# Patient Record
Sex: MALE | Race: WHITE | NOT HISPANIC OR LATINO | URBAN - METROPOLITAN AREA
[De-identification: names, ages, dates, MRNs, and addresses within clinical notes are randomized per-mention and may not be internally consistent; named-entity substitution may affect disease eponyms.]

---

## 2017-05-22 ENCOUNTER — INPATIENT (INPATIENT)
Facility: HOSPITAL | Age: 57
LOS: 1 days | Discharge: HOME | DRG: 948 | End: 2017-05-24
Attending: SURGERY | Admitting: SURGERY
Payer: COMMERCIAL

## 2017-05-22 VITALS
TEMPERATURE: 98 F | OXYGEN SATURATION: 98 % | RESPIRATION RATE: 20 BRPM | SYSTOLIC BLOOD PRESSURE: 137 MMHG | HEART RATE: 67 BPM | DIASTOLIC BLOOD PRESSURE: 85 MMHG

## 2017-05-22 DIAGNOSIS — Z90.49 ACQUIRED ABSENCE OF OTHER SPECIFIED PARTS OF DIGESTIVE TRACT: Chronic | ICD-10-CM

## 2017-05-22 PROCEDURE — 99285 EMERGENCY DEPT VISIT HI MDM: CPT

## 2017-05-22 RX ORDER — SODIUM CHLORIDE 9 MG/ML
1000 INJECTION, SOLUTION INTRAVENOUS
Qty: 0 | Refills: 0 | Status: DISCONTINUED | OUTPATIENT
Start: 2017-05-22 | End: 2017-05-24

## 2017-05-22 RX ORDER — ONDANSETRON 8 MG/1
4 TABLET, FILM COATED ORAL EVERY 6 HOURS
Qty: 0 | Refills: 0 | Status: DISCONTINUED | OUTPATIENT
Start: 2017-05-22 | End: 2017-05-24

## 2017-05-22 RX ORDER — SENNA PLUS 8.6 MG/1
2 TABLET ORAL AT BEDTIME
Qty: 0 | Refills: 0 | Status: DISCONTINUED | OUTPATIENT
Start: 2017-05-22 | End: 2017-05-24

## 2017-05-22 RX ORDER — MORPHINE SULFATE 50 MG/1
4 CAPSULE, EXTENDED RELEASE ORAL ONCE
Qty: 0 | Refills: 0 | Status: DISCONTINUED | OUTPATIENT
Start: 2017-05-22 | End: 2017-05-22

## 2017-05-22 RX ORDER — HYDROMORPHONE HYDROCHLORIDE 2 MG/ML
1 INJECTION INTRAMUSCULAR; INTRAVENOUS; SUBCUTANEOUS EVERY 4 HOURS
Qty: 0 | Refills: 0 | Status: DISCONTINUED | OUTPATIENT
Start: 2017-05-22 | End: 2017-05-24

## 2017-05-22 RX ORDER — DOCUSATE SODIUM 100 MG
100 CAPSULE ORAL THREE TIMES A DAY
Qty: 0 | Refills: 0 | Status: DISCONTINUED | OUTPATIENT
Start: 2017-05-22 | End: 2017-05-24

## 2017-05-22 RX ADMIN — MORPHINE SULFATE 4 MILLIGRAM(S): 50 CAPSULE, EXTENDED RELEASE ORAL at 21:38

## 2017-05-22 RX ADMIN — MORPHINE SULFATE 4 MILLIGRAM(S): 50 CAPSULE, EXTENDED RELEASE ORAL at 20:53

## 2017-05-22 NOTE — H&P ADULT - HISTORY OF PRESENT ILLNESS
57M pt Smoker, MVR s/p MV repair, POD 0 lap michael at [facility], presents to [facility] ED w/co of persistent epigastric abdominal pain, non radiating, stabbing, constant, w/o assc nausea or vomiting. Denies f/c/s. Denies cp/sob/palp. Has been administered multiple IV push of medications for pain without resolution.

## 2017-05-22 NOTE — PROGRESS NOTE ADULT - SUBJECTIVE AND OBJECTIVE BOX
· HPI Objective Statement: [facility]  Pt had cholecysectomy done this morning, reports intermittent episodes of severe abd pain. Pt denies D/N/V, SOB, chest pain, /GI symptoms.  Pt is alert and oriented x3, reports some tenderness around surgical sites.  No redness or inflammation noted at this time. Pt is alert and oriented x3.  18G IV in left AC by surgical team.	  · Presenting Symptoms: PAIN, TENDERNESS	  · Negative Findings: no abdominal distension, no blood in stool, no burning urination, no chills, no diarrhea, no dysuria, no fever, no hematuria, no nausea, no vomiting	        	  MEDICATIONS:        oxyCODONE  5 mG/acetaminophen 325 mG 1Tablet(s) Oral every 6 hours PRN            Complaint: Abdominal pain    Otherwise 12 point review of systems is normal.    PHYSICAL EXAM:    Constitutional: NAD  Eyes: PERRL, EOMI, sclera non-icteric  Neck: supple, no masses, no JVD  Respiratory: CTA b/l, good air entry b/l, no wheezing, rhonchi, rales, with normal respiratory effort and no intercostal retractions  Cardiovascular: RRR, normal S1S2, no M/R/G  Gastrointestinal: soft, tender  Extremities:  no c/c/e  Neurological: AAOx3  ICU Vital Signs Last 24 Hrs  T(C): 36.3, Max: 36.8 (05-22 @ 17:18)  T(F): 97.3, Max: 98.3 (05-22 @ 17:18)  HR: 55 (55 - 67)  BP: 137/73 (137/73 - 137/85)  BP(mean): --  ABP: --  ABP(mean): --  RR: 20 (20 - 20)  SpO2: 99% (98% - 99%)      ECG:      CHEST X RAY    CT    MRI    MRA    CT ANGIO    CAROTID DUPLEX    DUPLEX     Echocardiogram    Catheterization:    Stress Test:     LABS:	 	  CARDIAC MARKERS:                              11.0   11.3  )-----------( 167      ( 22 May 2017 18:08 )             33.0     05-22    138  |  102  |  17  ----------------------------<  115<H>  3.8   |  24  |  1.30    Ca    8.3<L>      22 May 2017 18:08    TPro  6.4  /  Alb  4.0  /  TBili  0.3  /  DBili  x   /  AST  23  /  ALT  15  /  AlkPhos  23<L>  05-22    proBNP:   Lipid Profile:   HgA1c:   TSH:           ASSESSMENT/PLAN: 	  As per Dr Umanzor

## 2017-05-22 NOTE — ED PROVIDER NOTE - NS ED ROS FT
Constitutional: no fever    All other systems negative except as per HPI Constitutional: no fever  +abdominalpain  no nausea   no vomitng    All other systems negative except as per HPI

## 2017-05-22 NOTE — ED CLERICAL - NS ED CLERK NOTE PRE-ARRIVAL INFORMATION; ADDITIONAL PRE-ARRIVAL INFORMATION
Antonio/GRAY/ TRACIE CAPUTO SENT IN BY DR. TRAYLOR FOR ABDOMINAL PAIN CALL DR. WILY MORELOS @ [phone]

## 2017-05-22 NOTE — H&P ADULT - NSHPLABSRESULTS_GEN_ALL_CORE
LABS:  CBC Full  -  ( 22 May 2017 18:08 )  WBC Count : 11.3 K/uL  Hemoglobin : 11.0 g/dL  Hematocrit : 33.0 %  Platelet Count - Automated : 167 K/uL  Mean Cell Volume : 90.4 fL  Mean Cell Hemoglobin : 30.1 pg  Mean Cell Hemoglobin Concentration : 33.3 g/dL  Auto Neutrophil # : x  Auto Lymphocyte # : x  Auto Monocyte # : x  Auto Eosinophil # : x  Auto Basophil # : x  Auto Neutrophil % : 89.0 %  Auto Lymphocyte % : 6.4 %  Auto Monocyte % : 4.4 %  Auto Eosinophil % : 0.1 %  Auto Basophil % : 0.1 %    05-22    138  |  102  |  17  ----------------------------<  115<H>  3.8   |  24  |  1.30    Ca    8.3<L>      22 May 2017 18:08    TPro  6.4  /  Alb  4.0  /  TBili  0.3  /  DBili  x   /  AST  23  /  ALT  15  /  AlkPhos  23<L>  05-22  Lipase, Serum (05.22.17 @ 18:08)    Lipase, Serum: 437 U/L  Amylase, Serum Total (05.22.17 @ 18:08)    Amylase, Serum Total: 231 U/L    PT/INR - ( 22 May 2017 18:08 )   PT: 11.3 sec;   INR: 1.02        PTT - ( 22 May 2017 18:08 )  PTT:33.3 sec

## 2017-05-22 NOTE — H&P ADULT - NSHPPHYSICALEXAM_GEN_ALL_CORE
Vital Signs Last 24 Hrs  T(C): 36.3, Max: 36.8 (05-22 @ 17:18)  T(F): 97.3, Max: 98.3 (05-22 @ 17:18)  HR: 55 (55 - 67)  BP: 137/73 (137/73 - 137/85)  BP(mean): --  RR: 20 (20 - 20)  SpO2: 99% (98% - 99%)    Physical Exam:   Gen: AOx3, pleasant in NAD  Cor: RRR, +S1S2, no MRG  Pulm: CTA b/l No W/R/S  Abd: +BS, Soft, non distended, TTP epigastrium, subcutaneous emphysema, incisions c/d/i  Ext: WWP, non edematous, DP +2 b/l

## 2017-05-22 NOTE — ED ADULT NURSE NOTE - CHPI ED SYMPTOMS NEG
no hematuria/no diarrhea/no chills/no fever/no vomiting/no blood in stool/no burning urination/no nausea/no abdominal distension/no dysuria

## 2017-05-22 NOTE — ED PROVIDER NOTE - OBJECTIVE STATEMENT
patient presents to the ed following a lap michael done earlier today .   he states he had the sx done by dr pimentel, and had severe postoperative pain for which he was sent to the ed to obtain labs patient presents to the ed following a lap michael done earlier today .   he states he had the sx done by dr pimentel, and had severe postoperative pain for which he was sent to the ed to obtain labs.  still complains of pain in the ed.   no fever no chills

## 2017-05-22 NOTE — H&P ADULT - ASSESSMENT
57M pt POD 0 lap michael with post op pain    -Admit for 23hr observation, Dr. Umanzor  -Radha, ADAT  -PO pain meds, IV pain meds only for breakthrough  -Repeat AM labs  -OOB ambulate  -VS/IO per routine

## 2017-05-22 NOTE — ED ADULT NURSE NOTE - OBJECTIVE STATEMENT
[facility]  Pt had cholecysectomy done this morning, reports intermittent episodes of severe abd pain. Pt denies D/N/V, SOB, chest pain, /GI symptoms.  Pt is alert and oriented x3, reports some tenderness around surgical sites.  No redness or inflammation noted at this time. Pt is alert and oriented x3.  18G IV in left AC by surgical team.

## 2017-05-22 NOTE — ED PROVIDER NOTE - PHYSICAL EXAMINATION
CONSTITUTIONAL: Well-appearing; well-nourished; in no apparent distress.   HEAD: Normocephalic; atraumatic.   EYES: PERRL; EOM intact; conjunctiva and sclera clear  ENT: normal nose; no rhinorrhea; normal pharynx with no erythema or lesions.   NECK: Supple; non-tender;   CARDIOVASCULAR: Normal S1, S2; no murmurs, rubs, or gallops. Regular rate and rhythm.   RESPIRATORY: Breathing easily; breath sounds clear and equal bilaterally; no wheezes, rhonchi, or rales.  GI: Soft; dressings clean dry and intact, no rigidity normal bowel sounds, +periincisional tenderness  EXT: No cyanosis or edema; N/V intact  SKIN: Normal for age and race; warm; dry; good turgor; no apparent lesions or rash.   NEURO: A & O x 3; face symmetric; grossly unremarkable.   PSYCHOLOGICAL: The patient’s mood and manner are appropriate.

## 2017-05-23 LAB
ALBUMIN SERPL ELPH-MCNC: 3.8 G/DL — SIGNIFICANT CHANGE UP (ref 3.3–5)
ALP SERPL-CCNC: 23 U/L — LOW (ref 40–120)
ALT FLD-CCNC: 13 U/L — SIGNIFICANT CHANGE UP (ref 10–45)
AMYLASE P1 CFR SERPL: 160 U/L — HIGH (ref 25–125)
ANION GAP SERPL CALC-SCNC: 9 MMOL/L — SIGNIFICANT CHANGE UP (ref 5–17)
AST SERPL-CCNC: 23 U/L — SIGNIFICANT CHANGE UP (ref 10–40)
BILIRUB SERPL-MCNC: 0.4 MG/DL — SIGNIFICANT CHANGE UP (ref 0.2–1.2)
BUN SERPL-MCNC: 17 MG/DL — SIGNIFICANT CHANGE UP (ref 7–23)
CALCIUM SERPL-MCNC: 8.2 MG/DL — LOW (ref 8.4–10.5)
CHLORIDE SERPL-SCNC: 105 MMOL/L — SIGNIFICANT CHANGE UP (ref 96–108)
CO2 SERPL-SCNC: 25 MMOL/L — SIGNIFICANT CHANGE UP (ref 22–31)
CREAT SERPL-MCNC: 1.3 MG/DL — SIGNIFICANT CHANGE UP (ref 0.5–1.3)
GLUCOSE SERPL-MCNC: 89 MG/DL — SIGNIFICANT CHANGE UP (ref 70–99)
HCT VFR BLD CALC: 33.3 % — LOW (ref 39–50)
HGB BLD-MCNC: 11 G/DL — LOW (ref 13–17)
LIDOCAIN IGE QN: 109 U/L — HIGH (ref 7–60)
MAGNESIUM SERPL-MCNC: 2.2 MG/DL — SIGNIFICANT CHANGE UP (ref 1.6–2.6)
MCHC RBC-ENTMCNC: 30.3 PG — SIGNIFICANT CHANGE UP (ref 27–34)
MCHC RBC-ENTMCNC: 33 G/DL — SIGNIFICANT CHANGE UP (ref 32–36)
MCV RBC AUTO: 91.7 FL — SIGNIFICANT CHANGE UP (ref 80–100)
PHOSPHATE SERPL-MCNC: 3.7 MG/DL — SIGNIFICANT CHANGE UP (ref 2.5–4.5)
PLATELET # BLD AUTO: 176 K/UL — SIGNIFICANT CHANGE UP (ref 150–400)
POTASSIUM SERPL-MCNC: 4 MMOL/L — SIGNIFICANT CHANGE UP (ref 3.5–5.3)
POTASSIUM SERPL-SCNC: 4 MMOL/L — SIGNIFICANT CHANGE UP (ref 3.5–5.3)
PROT SERPL-MCNC: 6.1 G/DL — SIGNIFICANT CHANGE UP (ref 6–8.3)
RBC # BLD: 3.63 M/UL — LOW (ref 4.2–5.8)
RBC # FLD: 14.7 % — SIGNIFICANT CHANGE UP (ref 10.3–16.9)
SODIUM SERPL-SCNC: 139 MMOL/L — SIGNIFICANT CHANGE UP (ref 135–145)
WBC # BLD: 9 K/UL — SIGNIFICANT CHANGE UP (ref 3.8–10.5)
WBC # FLD AUTO: 9 K/UL — SIGNIFICANT CHANGE UP (ref 3.8–10.5)

## 2017-05-23 PROCEDURE — 74183 MRI ABD W/O CNTR FLWD CNTR: CPT | Mod: 26

## 2017-05-23 RX ORDER — HYDROMORPHONE HYDROCHLORIDE 2 MG/ML
0.5 INJECTION INTRAMUSCULAR; INTRAVENOUS; SUBCUTANEOUS ONCE
Qty: 0 | Refills: 0 | Status: DISCONTINUED | OUTPATIENT
Start: 2017-05-23 | End: 2017-05-23

## 2017-05-23 RX ADMIN — HYDROMORPHONE HYDROCHLORIDE 1 MILLIGRAM(S): 2 INJECTION INTRAMUSCULAR; INTRAVENOUS; SUBCUTANEOUS at 01:00

## 2017-05-23 RX ADMIN — Medication 100 MILLIGRAM(S): at 22:21

## 2017-05-23 RX ADMIN — HYDROMORPHONE HYDROCHLORIDE 1 MILLIGRAM(S): 2 INJECTION INTRAMUSCULAR; INTRAVENOUS; SUBCUTANEOUS at 06:00

## 2017-05-23 RX ADMIN — HYDROMORPHONE HYDROCHLORIDE 0.5 MILLIGRAM(S): 2 INJECTION INTRAMUSCULAR; INTRAVENOUS; SUBCUTANEOUS at 17:53

## 2017-05-23 RX ADMIN — SODIUM CHLORIDE 80 MILLILITER(S): 9 INJECTION, SOLUTION INTRAVENOUS at 09:34

## 2017-05-23 RX ADMIN — HYDROMORPHONE HYDROCHLORIDE 1 MILLIGRAM(S): 2 INJECTION INTRAMUSCULAR; INTRAVENOUS; SUBCUTANEOUS at 00:34

## 2017-05-23 RX ADMIN — HYDROMORPHONE HYDROCHLORIDE 1 MILLIGRAM(S): 2 INJECTION INTRAMUSCULAR; INTRAVENOUS; SUBCUTANEOUS at 08:13

## 2017-05-23 RX ADMIN — HYDROMORPHONE HYDROCHLORIDE 0.5 MILLIGRAM(S): 2 INJECTION INTRAMUSCULAR; INTRAVENOUS; SUBCUTANEOUS at 18:32

## 2017-05-23 NOTE — PROGRESS NOTE ADULT - SUBJECTIVE AND OBJECTIVE BOX
O/N: Admitted for observation with post-operative pain. Labs significant for elevated amylase/lipase. O/N: Admitted for observation with post-operative pain. Labs significant for elevated amylase/lipase.    STATUS POST:      SUBJECTIVE:  Patient seen and examined at bedside with surgery chief resident on AM rounds. Patient states he was just given pain medication but states his pain has been better over night. Denies N/V    MEDICATIONS  (STANDING):  lactated ringers. 1000milliLiter(s) IV Continuous <Continuous>  docusate sodium 100milliGRAM(s) Oral three times a day    MEDICATIONS  (PRN):  oxyCODONE  5 mG/acetaminophen 325 mG 1Tablet(s) Oral every 4 hours PRN Moderate Pain  oxyCODONE  5 mG/acetaminophen 325 mG 2Tablet(s) Oral every 4 hours PRN Severe Pain  HYDROmorphone  Injectable 1milliGRAM(s) IV Push every 4 hours PRN breakthrough abdominal pain  ondansetron Injectable 4milliGRAM(s) IV Push every 6 hours PRN Nausea  senna 2Tablet(s) Oral at bedtime PRN Constipation      Vital Signs Last 24 Hrs  T(C): 36.7, Max: 36.8 (05-22 @ 17:18)  T(F): 98.1, Max: 98.3 (05-22 @ 17:18)  HR: 54 (54 - 67)  BP: 104/62 (104/62 - 137/85)  BP(mean): --  RR: 18 (18 - 20)  SpO2: 97% (97% - 99%)    PHYSICAL EXAM:      Constitutional: A&Ox3    Respiratory: non labored breathing, no respiratory distress    Cardiovascular: NSR, RRR    Gastrointestinal: softly distended, minimal tympany, +BS                 Incision: c/d/i     Extremities: (-) edema    I&O's Detail      LABS:                        11.0   9.0   )-----------( 176      ( 23 May 2017 06:25 )             33.3     05-23    139  |  105  |  17  ----------------------------<  89  4.0   |  25  |  1.30    Ca    8.2<L>      23 May 2017 06:23  Phos  3.7     05-23  Mg     2.2     05-23    TPro  6.1  /  Alb  3.8  /  TBili  0.4  /  DBili  x   /  AST  23  /  ALT  13  /  AlkPhos  23<L>  05-23    PT/INR - ( 22 May 2017 18:08 )   PT: 11.3 sec;   INR: 1.02          PTT - ( 22 May 2017 18:08 )  PTT:33.3 sec      RADIOLOGY & ADDITIONAL STUDIES:

## 2017-05-23 NOTE — PROGRESS NOTE ADULT - SUBJECTIVE AND OBJECTIVE BOX
· HPI Objective Statement: [facility]  Pt had cholecysectomy done this morning, reports intermittent episodes of severe abd pain. Pt denies D/N/V, SOB, chest pain, /GI symptoms.  Pt is alert and oriented x3, reports some tenderness around surgical sites.  No redness or inflammation noted at this time. Pt is alert and oriented x3.  18G IV in left AC by surgical team.	  · Presenting Symptoms: PAIN, TENDERNESS	  · Negative Findings: no abdominal distension, no blood in stool, no burning urination, no chills, no diarrhea, no dysuria, no fever, no hematuria, no nausea, no vomiting	        	  MEDICATIONS:        oxyCODONE  5 mG/acetaminophen 325 mG 1Tablet(s) Oral every 4 hours PRN  oxyCODONE  5 mG/acetaminophen 325 mG 2Tablet(s) Oral every 4 hours PRN  HYDROmorphone  Injectable 1milliGRAM(s) IV Push every 4 hours PRN  ondansetron Injectable 4milliGRAM(s) IV Push every 6 hours PRN    docusate sodium 100milliGRAM(s) Oral three times a day  senna 2Tablet(s) Oral at bedtime PRN      lactated ringers. 1000milliLiter(s) IV Continuous <Continuous>      Complaint: Pain    Otherwise 12 point review of systems is normal.    PHYSICAL EXAM:    Constitutional: NAD  Eyes: PERRL, EOMI, sclera non-icteric  Neck: supple, no masses, no JVD  Respiratory: CTA b/l, good air entry b/l, no wheezing, rhonchi, rales, with normal respiratory effort and no intercostal retractions  Cardiovascular: RRR, normal S1S2, no M/R/G  Gastrointestinal: soft, tender,   Extremities:  no c/c/e  Neurological: AAOx3    ICU Vital Signs Last 24 Hrs  T(C): 36.7, Max: 36.8 (05-23 @ 17:41)  T(F): 98.1, Max: 98.2 (05-23 @ 17:41)  HR: 58 (45 - 62)  BP: 121/75 (93/55 - 133/78)  BP(mean): --  ABP: --  ABP(mean): --  RR: 16 (16 - 20)  SpO2: 97% (95% - 99%)      ECG:      CHEST X RAY    CT    MRI  INTERPRETATION:  MRI of the ABDOMEN with and without gadolinium dated   5/23/2017 4:59 PM    INDICATION: Severe right upper quadrant pain. Status post cholecystectomy.    TECHNIQUE: MRI of the abdomen was performed in the axial, coronal, and   radial projections with attention to the liver.  Images were obtained   before and after gadolinium.    PRIOR STUDIES: None    FINDINGS: Images of the lower chest demonstrate evidence of a sternotomy.   Heart size is enlarged. Linear atelectasis is noted in the left lower   lobe.    Postoperative changes are seen in the anterior abdominal wall. Images of   the upper abdomen demonstrate multiple small hepatic cysts measuring up   to 1.3 cm.  There is a 2.1 cm uniformly enhancing lesion in hepatic   segment 5 on arterial phase of the study which retains the contrast on   delayed images, most likely a flash filled hemangioma. No dilated   intrahepatic or extrahepatic bile ducts are seen. CBD measures 0.7 cm. No   filling defect is seen within the common bile duct. The patient is status   post cholecystectomy. Small amount of free fluid is seen in the   subhepatic space. Trace ascites is seen in the abdomen. The pancreas is   normal in appearance.  The pancreatic duct is not dilated.  The   pancreatic duct inserts onto the major papilla.  Spleen measures 13.8 cm.   No focal splenic abnormalities are seen.    The right adrenal gland is unremarkable.  There is nonspecific nodular   thickening of the left adrenal gland. Both kidneys opacify symmetrically   with contrast without evidence of hydronephrosis.  There is a 1.3 cm cyst   with probable hemorrhagic component in the lower pole of the right   kidney. There is a 1.7 cm cyst with similar appearance in the upper pole   of the left kidney. There are a few other small simple cysts in both  kidneys.    No abdominal aortic aneurysm is seen.  No retroperitoneal lymphadenopathy   is seen.      IMPRESSION:  Status post cholecystectomy. Small ascites, most visible in the   subhepatic space. Bile leak is not excluded.    No biliary dilatation or evidence of choledocholithiasis.    Hepatic and renal cysts.    2.1 cm enhancing hepatic lesion, likely a flash filled hemangioma.            MRA    CT ANGIO    CAROTID DUPLEX    DUPLEX     Echocardiogram    Catheterization:    Stress Test:     LABS:	 	  CARDIAC MARKERS:                              11.0   9.0   )-----------( 176      ( 23 May 2017 06:25 )             33.3     05-23    139  |  105  |  17  ----------------------------<  89  4.0   |  25  |  1.30    Ca    8.2<L>      23 May 2017 06:23  Phos  3.7     05-23  Mg     2.2     05-23    TPro  6.1  /  Alb  3.8  /  TBili  0.4  /  DBili  x   /  AST  23  /  ALT  13  /  AlkPhos  23<L>  05-23        ASSESSMENT/PLAN: 	    As per Surgery

## 2017-05-23 NOTE — PROGRESS NOTE ADULT - ASSESSMENT
57yoM smoker with PMH MVR s/p replacement admitted POD0 after laparoscopic cholecystectomy with epigastric/RUQ pain    LF CLD  IVF  Pain/nausea control  SCDs/OOBA/IS

## 2017-05-24 VITALS
HEART RATE: 48 BPM | DIASTOLIC BLOOD PRESSURE: 81 MMHG | RESPIRATION RATE: 16 BRPM | OXYGEN SATURATION: 96 % | SYSTOLIC BLOOD PRESSURE: 129 MMHG | TEMPERATURE: 98 F

## 2017-05-24 LAB
ALBUMIN SERPL ELPH-MCNC: 3.5 G/DL — SIGNIFICANT CHANGE UP (ref 3.3–5)
ALP SERPL-CCNC: 24 U/L — LOW (ref 40–120)
ALT FLD-CCNC: 17 U/L — SIGNIFICANT CHANGE UP (ref 10–45)
ANION GAP SERPL CALC-SCNC: 9 MMOL/L — SIGNIFICANT CHANGE UP (ref 5–17)
AST SERPL-CCNC: 26 U/L — SIGNIFICANT CHANGE UP (ref 10–40)
BILIRUB SERPL-MCNC: 0.4 MG/DL — SIGNIFICANT CHANGE UP (ref 0.2–1.2)
BUN SERPL-MCNC: 12 MG/DL — SIGNIFICANT CHANGE UP (ref 7–23)
CALCIUM SERPL-MCNC: 8.4 MG/DL — SIGNIFICANT CHANGE UP (ref 8.4–10.5)
CHLORIDE SERPL-SCNC: 104 MMOL/L — SIGNIFICANT CHANGE UP (ref 96–108)
CO2 SERPL-SCNC: 27 MMOL/L — SIGNIFICANT CHANGE UP (ref 22–31)
CREAT SERPL-MCNC: 1.2 MG/DL — SIGNIFICANT CHANGE UP (ref 0.5–1.3)
GLUCOSE SERPL-MCNC: 93 MG/DL — SIGNIFICANT CHANGE UP (ref 70–99)
HCT VFR BLD CALC: 33.7 % — LOW (ref 39–50)
HGB BLD-MCNC: 11.1 G/DL — LOW (ref 13–17)
MAGNESIUM SERPL-MCNC: 2.2 MG/DL — SIGNIFICANT CHANGE UP (ref 1.6–2.6)
MCHC RBC-ENTMCNC: 29.8 PG — SIGNIFICANT CHANGE UP (ref 27–34)
MCHC RBC-ENTMCNC: 32.9 G/DL — SIGNIFICANT CHANGE UP (ref 32–36)
MCV RBC AUTO: 90.6 FL — SIGNIFICANT CHANGE UP (ref 80–100)
PHOSPHATE SERPL-MCNC: 3 MG/DL — SIGNIFICANT CHANGE UP (ref 2.5–4.5)
PLATELET # BLD AUTO: 158 K/UL — SIGNIFICANT CHANGE UP (ref 150–400)
POTASSIUM SERPL-MCNC: 4.6 MMOL/L — SIGNIFICANT CHANGE UP (ref 3.5–5.3)
POTASSIUM SERPL-SCNC: 4.6 MMOL/L — SIGNIFICANT CHANGE UP (ref 3.5–5.3)
PROT SERPL-MCNC: 5.9 G/DL — LOW (ref 6–8.3)
RBC # BLD: 3.72 M/UL — LOW (ref 4.2–5.8)
RBC # FLD: 14.8 % — SIGNIFICANT CHANGE UP (ref 10.3–16.9)
SODIUM SERPL-SCNC: 140 MMOL/L — SIGNIFICANT CHANGE UP (ref 135–145)
WBC # BLD: 7.1 K/UL — SIGNIFICANT CHANGE UP (ref 3.8–10.5)
WBC # FLD AUTO: 7.1 K/UL — SIGNIFICANT CHANGE UP (ref 3.8–10.5)

## 2017-05-24 PROCEDURE — A9577: CPT

## 2017-05-24 PROCEDURE — 84100 ASSAY OF PHOSPHORUS: CPT

## 2017-05-24 PROCEDURE — 83690 ASSAY OF LIPASE: CPT

## 2017-05-24 PROCEDURE — 99285 EMERGENCY DEPT VISIT HI MDM: CPT | Mod: 25

## 2017-05-24 PROCEDURE — 36415 COLL VENOUS BLD VENIPUNCTURE: CPT

## 2017-05-24 PROCEDURE — 85730 THROMBOPLASTIN TIME PARTIAL: CPT

## 2017-05-24 PROCEDURE — 96374 THER/PROPH/DIAG INJ IV PUSH: CPT

## 2017-05-24 PROCEDURE — 85610 PROTHROMBIN TIME: CPT

## 2017-05-24 PROCEDURE — 85025 COMPLETE CBC W/AUTO DIFF WBC: CPT

## 2017-05-24 PROCEDURE — 82150 ASSAY OF AMYLASE: CPT

## 2017-05-24 PROCEDURE — 80053 COMPREHEN METABOLIC PANEL: CPT

## 2017-05-24 PROCEDURE — 83735 ASSAY OF MAGNESIUM: CPT

## 2017-05-24 PROCEDURE — 85027 COMPLETE CBC AUTOMATED: CPT

## 2017-05-24 PROCEDURE — 74183 MRI ABD W/O CNTR FLWD CNTR: CPT

## 2017-05-24 RX ORDER — OXYCODONE HYDROCHLORIDE 5 MG/1
1 TABLET ORAL
Qty: 10 | Refills: 0 | OUTPATIENT
Start: 2017-05-24

## 2017-05-24 RX ADMIN — Medication 100 MILLIGRAM(S): at 06:53

## 2017-05-24 NOTE — PROGRESS NOTE ADULT - SUBJECTIVE AND OBJECTIVE BOX
· HPI Objective Statement: [facility]  Pt had cholecysectomy done this morning, reports intermittent episodes of severe abd pain. Pt denies D/N/V, SOB, chest pain, /GI symptoms.  Pt is alert and oriented x3, reports some tenderness around surgical sites.  No redness or inflammation noted at this time. Pt is alert and oriented x3.  18G IV in left AC by surgical team.	  · Presenting Symptoms: PAIN, TENDERNESS	  · Negative Findings: no abdominal distension, no blood in stool, no burning urination, no chills, no diarrhea, no dysuria, no fever, no hematuria, no nausea, no vomiting	          	  MEDICATIONS:        oxyCODONE  5 mG/acetaminophen 325 mG 1Tablet(s) Oral every 4 hours PRN  oxyCODONE  5 mG/acetaminophen 325 mG 2Tablet(s) Oral every 4 hours PRN  HYDROmorphone  Injectable 1milliGRAM(s) IV Push every 4 hours PRN  ondansetron Injectable 4milliGRAM(s) IV Push every 6 hours PRN    docusate sodium 100milliGRAM(s) Oral three times a day  senna 2Tablet(s) Oral at bedtime PRN      lactated ringers. 1000milliLiter(s) IV Continuous <Continuous>      Complaint: Feeling better    Otherwise 12 point review of systems is normal.    PHYSICAL EXAM:    Constitutional:NAD  Eyes: PERRL, EOMI, sclera non-icteric  Neck: supple, no masses, no JVD  Respiratory: CTA b/l, good air entry b/l, no wheezing, rhonchi, rales, with normal respiratory effort and no intercostal retractions  Cardiovascular: RRR, normal S1S2, no M/R/G  Gastrointestinal: soft, NTND, no masses palpable,   Extremities:  no c/c/e  Neurological: AAOx3      ICU Vital Signs Last 24 Hrs  T(C): 36.5, Max: 36.8 (05-23 @ 22:20)  T(F): 97.7, Max: 98.2 (05-23 @ 22:20)  HR: 48 (48 - 109)  BP: 129/81 (113/72 - 129/81)  BP(mean): --  ABP: --  ABP(mean): --  RR: 16 (16 - 16)  SpO2: 96% (96% - 97%)        ECG:      CHEST X RAY    CT    MRI    MRA    CT ANGIO    CAROTID DUPLEX    DUPLEX     Echocardiogram    Catheterization:    Stress Test:     LABS:	 	  CARDIAC MARKERS:                              11.1   7.1   )-----------( 158      ( 24 May 2017 06:44 )             33.7     05-24    140  |  104  |  12  ----------------------------<  93  4.6   |  27  |  1.20    Ca    8.4      24 May 2017 06:44  Phos  3.0     05-24  Mg     2.2     05-24    TPro  5.9<L>  /  Alb  3.5  /  TBili  0.4  /  DBili  x   /  AST  26  /  ALT  17  /  AlkPhos  24<L>  05-24        ASSESSMENT/PLAN: 	    MARYANN heller

## 2017-05-24 NOTE — PROGRESS NOTE ADULT - ASSESSMENT
57yoM smoker with PMH MVR s/p replacement admitted POD0 after laparoscopic cholecystectomy with epigastric/RUQ pain    LF CLD  IVF  Pain/nausea control  SCDs/OOBA/IS  f/u MRCP   AM labs   d/c once pain adequately controlled

## 2017-05-24 NOTE — DISCHARGE NOTE ADULT - CARE PLAN
Principal Discharge DX:	History of laparoscopic cholecystectomy  Goal:	pain control  Instructions for follow-up, activity and diet:	Please follow up with Dr. Umanzor in 1-2 weeks.  Please call his office to make an appointment.  Please stay on a low fat diet for the next 30 days and then you may try adding fats as tolerated.  Please remove bandages when you get home.  You may shower as you normally would, and allow soap and water to run over the incisions, do NOT scrub.  You have been prescribed pain medication.  Do not drive or operate machinery while taking this medication.  Please alert Dr. Umanzor, or return to the ED, if you develop Fever greater than 101F, pain not controlled by pain medication, bleeding from incision site, nausea, vomiting, inability to pass gas, CP or SOB.

## 2017-05-24 NOTE — DISCHARGE NOTE ADULT - PLAN OF CARE
pain control Please follow up with Dr. Umanzor in 1-2 weeks.  Please call his office to make an appointment.  Please stay on a low fat diet for the next 30 days and then you may try adding fats as tolerated.  Please remove bandages when you get home.  You may shower as you normally would, and allow soap and water to run over the incisions, do NOT scrub.  You have been prescribed pain medication.  Do not drive or operate machinery while taking this medication.  Please alert Dr. Umanzor, or return to the ED, if you develop Fever greater than 101F, pain not controlled by pain medication, bleeding from incision site, nausea, vomiting, inability to pass gas, CP or SOB.

## 2017-05-24 NOTE — PROGRESS NOTE ADULT - SUBJECTIVE AND OBJECTIVE BOX
O/N: Placed on CLD. MRCP showed small ascites, no biliary dilation or choledocholithiasis.  5/23: MRCP for increased pain. Cont CLD. O/N: Placed on CLD. MRCP showed small ascites, no biliary dilation or choledocholithiasis.  5/23: MRCP for increased pain. Cont CLD.     STATUS POST:  lap michael    POST OPERATIVE DAY #: 2     SUBJECTIVE:  Flatus: [x ] YES [ ] NO             Bowel Movement: [x ] YES [ ] NO  Pain Control Adequate: [x ] YES [ ] NO  Nausea: [ ] YES [x ] NO            Vomiting: [ ] YES [x ] NO  Diarrhea: [ ] YES [x ] NO         Constipation: [ ] YES [x ] NO     Chest Pain: [ ] YES [x ] NO    SOB:  [ ] YES [x ] NO    MEDICATIONS  (STANDING):  lactated ringers. 1000milliLiter(s) IV Continuous <Continuous>  docusate sodium 100milliGRAM(s) Oral three times a day    MEDICATIONS  (PRN):  oxyCODONE  5 mG/acetaminophen 325 mG 1Tablet(s) Oral every 4 hours PRN Moderate Pain  oxyCODONE  5 mG/acetaminophen 325 mG 2Tablet(s) Oral every 4 hours PRN Severe Pain  HYDROmorphone  Injectable 1milliGRAM(s) IV Push every 4 hours PRN breakthrough abdominal pain  ondansetron Injectable 4milliGRAM(s) IV Push every 6 hours PRN Nausea  senna 2Tablet(s) Oral at bedtime PRN Constipation      Vital Signs Last 24 Hrs  T(C): 36.4, Max: 36.8 (05-23 @ 17:41)  T(F): 97.6, Max: 98.2 (05-23 @ 17:41)  HR: 109 (45 - 109)  BP: 126/81 (93/55 - 126/81)  BP(mean): --  RR: 16 (16 - 18)  SpO2: 96% (95% - 98%)    PHYSICAL EXAM:      Constitutional: NAD, A&Ox3    Gastrointestinal: Soft, attp in RUQ, ND, No guarding or rebound    Extremities: no peripheral edema    Incision: CDI        I&O's Detail  I & Os for 24h ending 24 May 2017 07:00  =============================================  IN:    lactated ringers.: 800 ml    Total IN: 800 ml  ---------------------------------------------  OUT:    Total OUT: 0 ml  ---------------------------------------------  Total NET: 800 ml    I & Os for current day (as of 24 May 2017 08:26)  =============================================  IN:    lactated ringers.: 80 ml    Total IN: 80 ml  ---------------------------------------------  OUT:    Total OUT: 0 ml  ---------------------------------------------  Total NET: 80 ml      LABS:                        11.1   7.1   )-----------( 158      ( 24 May 2017 06:44 )             33.7     05-24    140  |  104  |  12  ----------------------------<  93  4.6   |  27  |  1.20    Ca    8.4      24 May 2017 06:44  Phos  3.0     05-24  Mg     2.2     05-24    TPro  5.9<L>  /  Alb  3.5  /  TBili  0.4  /  DBili  x   /  AST  26  /  ALT  17  /  AlkPhos  24<L>  05-24    PT/INR - ( 22 May 2017 18:08 )   PT: 11.3 sec;   INR: 1.02          PTT - ( 22 May 2017 18:08 )  PTT:33.3 sec        RADIOLOGY & ADDITIONAL STUDIES:

## 2017-05-24 NOTE — DISCHARGE NOTE ADULT - PATIENT PORTAL LINK FT
“You can access the [employer] Patient Portal, offered by [facility], by registering with the following website: [email or web address]”

## 2017-05-24 NOTE — DISCHARGE NOTE ADULT - HOSPITAL COURSE
Pt presented ti [facility] with post operative pain s/p lap michael at an outside hospital.  Pt underwent MRCP which showed no biliary dilation or choledocolithiasis.  Pts pain improved and was advanced to CLD which was tolerated.  At time of discharge, pt tolerating diet, pain controlled, and AVSS.

## 2017-05-24 NOTE — DISCHARGE NOTE ADULT - MEDICATION SUMMARY - MEDICATIONS TO TAKE
I will START or STAY ON the medications listed below when I get home from the hospital:    acetaminophen-oxycodone 325 mg-5 mg oral tablet  -- 1 tab(s) by mouth every 6 hours, As Needed -for severe pain MDD:6  -- Caution federal law prohibits the transfer of this drug to any person other  than the person for whom it was prescribed.  May cause drowsiness.  Alcohol may intensify this effect.  Use care when operating dangerous machinery.  This prescription cannot be refilled.  This product contains acetaminophen.  Do not use  with any other product containing acetaminophen to prevent possible liver damage.  Using more of this medication than prescribed may cause serious breathing problems.    -- Indication: For pain medication

## 2017-05-26 DIAGNOSIS — R18.8 OTHER ASCITES: ICD-10-CM

## 2017-05-26 DIAGNOSIS — I34.1 NONRHEUMATIC MITRAL (VALVE) PROLAPSE: ICD-10-CM

## 2017-05-26 DIAGNOSIS — F17.210 NICOTINE DEPENDENCE, CIGARETTES, UNCOMPLICATED: ICD-10-CM

## 2017-05-26 DIAGNOSIS — G89.18 OTHER ACUTE POSTPROCEDURAL PAIN: ICD-10-CM

## 2017-05-26 DIAGNOSIS — Z04.8 ENCOUNTER FOR EXAMINATION AND OBSERVATION FOR OTHER SPECIFIED REASONS: ICD-10-CM

## 2017-05-26 DIAGNOSIS — Z90.49 ACQUIRED ABSENCE OF OTHER SPECIFIED PARTS OF DIGESTIVE TRACT: ICD-10-CM

## 2017-05-26 DIAGNOSIS — R10.13 EPIGASTRIC PAIN: ICD-10-CM

## 2017-05-26 DIAGNOSIS — E03.9 HYPOTHYROIDISM, UNSPECIFIED: ICD-10-CM

## 2017-05-30 DIAGNOSIS — Z95.2 PRESENCE OF PROSTHETIC HEART VALVE: ICD-10-CM
